# Patient Record
Sex: MALE | Race: BLACK OR AFRICAN AMERICAN | NOT HISPANIC OR LATINO | Employment: FULL TIME | URBAN - METROPOLITAN AREA
[De-identification: names, ages, dates, MRNs, and addresses within clinical notes are randomized per-mention and may not be internally consistent; named-entity substitution may affect disease eponyms.]

---
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
## 2023-02-27 ENCOUNTER — OFFICE VISIT (OUTPATIENT)
Dept: PSYCHOLOGY | Facility: CLINIC | Age: 18
End: 2023-02-27
Payer: MEDICAID

## 2023-02-27 DIAGNOSIS — F70 MILD INTELLECTUAL DISABILITY: ICD-10-CM

## 2023-02-27 DIAGNOSIS — F40.10 SOCIAL ANXIETY DISORDER: Primary | ICD-10-CM

## 2023-02-27 PROCEDURE — 99999 PR PBB SHADOW E&M-EST. PATIENT-LVL I: ICD-10-PCS | Mod: PBBFAC,,, | Performed by: PSYCHOLOGIST

## 2023-02-27 PROCEDURE — 90847 PR FAMILY PSYCHOTHERAPY W/ PT, 50 MIN: ICD-10-PCS | Mod: AH,HA,, | Performed by: PSYCHOLOGIST

## 2023-02-27 PROCEDURE — 90847 FAMILY PSYTX W/PT 50 MIN: CPT | Mod: AH,HA,, | Performed by: PSYCHOLOGIST

## 2023-02-27 PROCEDURE — 99999 PR PBB SHADOW E&M-EST. PATIENT-LVL I: CPT | Mod: PBBFAC,,, | Performed by: PSYCHOLOGIST

## 2023-02-27 PROCEDURE — 99211 OFF/OP EST MAY X REQ PHY/QHP: CPT | Mod: PBBFAC,PO | Performed by: PSYCHOLOGIST

## 2023-03-14 NOTE — PROGRESS NOTES
"[facility]  Integrated Primary Care Outpatient Clinic  Pediatric Psychology Follow-up Progress Note      Name: Shakeel Hudson III   MRN: [id number]   YOB: 2005; Age: 17 y.o.   Gender: Male   Date of evaluation: 2/27/2023   Payor: MEDICAID / Plan: [facility] COMMUNITY [facility] ([place] MEDICA[place]) / Product Type: Managed Medicaid /        REFERRAL REASON:   Shakeel Hudson III is a 17 y.o. Black or /Not  or /a male presenting to the [facility] outpatient clinic for follow-up.    Treatment goals:  Decrease functional impairment caused by referral concerns.   Learn adaptive coping skills to manage referral concerns.    SUBJECTIVE & OBJECTIVE:   Conducted brief check-in with patient and mother.  Family/patient reported that patient continues to endorse symptoms of depression and social anxiety. Shakeel reportedly gets very uncomfortable about engaging with strangers, making small talk, and being the center of attention; these concerns reportedly do interfere with independence in daily activities. Patient is open to starting treatment for social anxiety and learning strategies for managing his mood.   He described his mood as "in the middle". He denied any current/recent suicidal ideation, self-harm, homicidal ideation, intent, or plan. He reports feeling safe at home.     Behavioral Observations:  Appearance: Casually dressed, Well groomed, and No abnormalities noted  Behavior: Calm, Cooperative, Engaged, and Shy  Rapport: Difficult to establish and difficult to maintain  Mood: Anxious  Affect: Flat and Restricted  Psychomotor: Lethargic     Speech: Rate, rhythm, pitch, fluency, and volume WNL for chronological age  Language: Expressive language skills appear limited for chronological age    ASSESSMENT & PLAN:   Diagnostic Impressions:  Based on the diagnostic evaluation and background information provided, the current diagnoses are:     ICD-10-CM ICD-9-CM   1. " Social anxiety disorder  F40.10 300.23   2. Mild intellectual disability  F70 317       Treatment plan and recommended interventions:  Outpatient therapy/counseling: [facility] therapist (Joselyn [facility], [facility])    Reviewed information discussed at previous visit.  Conducted brief assessment of patient's current emotional and behavioral functioning.  Provided psychoeducation about the potential benefits of outpatient therapy to address the present referral concerns.    Response to intervention: cooperation. Intervention rationale: Intervention is consistent with evidence-based practice for patient's presenting concerns; Intervention addresses contextual factors impacting diagnosis, symptoms, and/or impairment. Patient/family appear to be making minimal progress  given their current stage in treatment.       Plan for follow up:   Psychology will continue to follow patient at future routine clinic visits.      No future appointments.      Start time: 2:37 PM  End time: 2:53 PM  Face-to-face: 16 minutes    Level of Service: Family therapy with patient, 26+ minutes [08421]  This includes face to face time and non-face to face time preparing to see the patient (eg, chart review), obtaining and/or reviewing separately obtained history, documenting clinical information in the electronic health record, independently interpreting results and communicating results to the patient/family/caregiver, care coordinator, and/or referring provider.       Marah Harrell, PhD  Licensed Clinical Psychologist ([place]#[id number]; MS#)  [facility], Integrated Primary Care Clinic  [address] [place] [address]. [place] [address]  [phone]        REFERRALS PROVIDED:     Orders Placed This Encounter   Procedures    Ambulatory referral/consult to Child/Adolescent Psychology   Joselyn

## 2023-03-14 NOTE — PATIENT INSTRUCTIONS
To schedule a follow-up visit with the Integrated Pediatric Primary Care Psychology team at [facility], please call Ailin Milan: [phone].      Other helpful contacts & resources:    [facility]  [address], [place], [place] [address] [phone]  [email or web address]      [facility]:  [address], [place] [address]  phone: [phone]   [email or web address]           Mental Health Services in the [facility] Area  [Last updated 12/19/22]    FOR ADDITIONAL OPTIONS, Search and browse providers by location, insurance, and concerns:  [employer] [email or web address].[email or web address]  Psychology Today [email or web address]://[email or web address] Corporation.WorkCast/us/therapists    Almost ALL providers can offer virtual visits for your convenience    [facility]    Child/Adolescent:       [address] [place] [address]  18 and older:          [address] [place] [address]   [phone]     [facility]  [address] [place] [address]  [email or web address]   [phone]     [facility]  [address] [place] [address]  https://[facility]/jayshree/counseling-and-training-center.html    Training clinic staffed by PhD students, does not require insurance. Completely free. Virtual visits only. [phone]     [facility]  Department of Psychology   [address] [place] [address]  [email or web address]://sse.[facility]/psyc/clinic   [phone]     [facility]  [address] [place] [address]  [email or web address]      [phone]   [facility] [place] [facility]  [address] [facility] [address] [place] [address]  [email or web address]://www.[facility].com/   [phone]   [facility]  [facility] [place] [address]  [email or web address]  [phone]   [facility]  [address] [place] [address]  [email or web address]   [phone]         Providers accepting Medicaid  [Last updated 12/19/22]    [facility]  [email or web address]://[email or web address].[email or web address].org/     [address] [place] [address] ([place])  [address] [place] [address]  [address] [facility] [place] [address]  [address] [place], [place] [address] [phone]   [facility]  [address] [place] [address]  www.[employer]interventionrehabilitation.PhoneTell    [phone]     [facility]   [address] [place] [address]  [email or web address]://www.[employer] Executive[employer]context.PhoneTell/behavioral-mental-health  [phone]   [facility]  https://[facility].PhoneTell/     Offers free in-home therapy for families with Medicaid in: [place], [place], [place], [facility], [place], [place], [place], & [facility] [phone]   [facility] ([facility]) - [address] [place] [address]  [email or web address]://[email or web address].[email or web address].org/[facility]   [phone]     [facility]   [address] [place] [address]   [email or web address]://[email or web address]    [phone]     [facility]  [address] [place] [address]   http://www.[facility].org/home.html     $25 for children without Medicaid    [phone]     Almost ALL providers can offer virtual visits for your convenience

## 2023-05-22 ENCOUNTER — TELEPHONE (OUTPATIENT)
Dept: PSYCHOLOGY | Facility: CLINIC | Age: 18
End: 2023-05-22
Payer: MEDICAID

## 2023-08-01 ENCOUNTER — PATIENT OUTREACH (OUTPATIENT)
Dept: PSYCHOLOGY | Facility: CLINIC | Age: 18
End: 2023-08-01
Payer: MEDICAID

## 2023-08-16 ENCOUNTER — PATIENT OUTREACH (OUTPATIENT)
Dept: PSYCHOLOGY | Facility: CLINIC | Age: 18
End: 2023-08-16
Payer: MEDICAID

## 2024-01-08 ENCOUNTER — OCCUPATIONAL HEALTH (OUTPATIENT)
Dept: URGENT CARE | Facility: CLINIC | Age: 19
End: 2024-01-08

## 2024-01-08 DIAGNOSIS — Z02.1 PRE-EMPLOYMENT EXAMINATION: Primary | ICD-10-CM

## 2024-01-08 LAB
CTP QC/QA: YES
FECAL OCCULT BLOOD, POC: NEGATIVE

## 2024-01-08 PROCEDURE — 82977 ASSAY OF GGT: CPT | Mod: QW,S$GLB,, | Performed by: EMERGENCY MEDICINE

## 2024-01-08 PROCEDURE — 86580 TB INTRADERMAL TEST: CPT | Mod: S$GLB,,, | Performed by: EMERGENCY MEDICINE

## 2024-01-08 PROCEDURE — 82270 OCCULT BLOOD FECES: CPT | Mod: S$GLB,,, | Performed by: EMERGENCY MEDICINE

## 2024-01-08 PROCEDURE — 97750 PHYSICAL PERFORMANCE TEST: CPT | Mod: S$GLB,,, | Performed by: EMERGENCY MEDICINE

## 2024-01-08 PROCEDURE — 99499 UNLISTED E&M SERVICE: CPT | Mod: S$GLB,,, | Performed by: EMERGENCY MEDICINE

## 2024-01-08 PROCEDURE — 92552 PURE TONE AUDIOMETRY AIR: CPT | Mod: S$GLB,,, | Performed by: EMERGENCY MEDICINE

## 2024-01-08 PROCEDURE — 86592 SYPHILIS TEST NON-TREP QUAL: CPT | Mod: S$GLB,,, | Performed by: EMERGENCY MEDICINE

## 2024-01-08 PROCEDURE — 80061 LIPID PANEL: CPT | Mod: QW,S$GLB,, | Performed by: EMERGENCY MEDICINE

## 2024-01-08 PROCEDURE — 80053 COMPREHEN METABOLIC PANEL: CPT | Mod: QW,S$GLB,, | Performed by: EMERGENCY MEDICINE

## 2024-01-08 PROCEDURE — 87389 HIV-1 AG W/HIV-1&-2 AB AG IA: CPT | Mod: QW,S$GLB,, | Performed by: EMERGENCY MEDICINE

## 2024-01-08 PROCEDURE — 81003 URINALYSIS AUTO W/O SCOPE: CPT | Mod: QW,S$GLB,, | Performed by: EMERGENCY MEDICINE

## 2024-01-08 PROCEDURE — 80305 DRUG TEST PRSMV DIR OPT OBS: CPT | Mod: S$GLB,,, | Performed by: EMERGENCY MEDICINE

## 2024-01-08 PROCEDURE — 85025 COMPLETE CBC W/AUTO DIFF WBC: CPT | Mod: QW,S$GLB,, | Performed by: EMERGENCY MEDICINE

## 2024-02-05 ENCOUNTER — OCCUPATIONAL HEALTH (OUTPATIENT)
Dept: URGENT CARE | Facility: CLINIC | Age: 19
End: 2024-02-05

## 2024-02-05 DIAGNOSIS — Z23 ENCOUNTER FOR IMMUNIZATION: Primary | ICD-10-CM

## 2024-02-05 PROCEDURE — 90739 HEPB VACC 2/4 DOSE ADULT IM: CPT | Mod: S$GLB,,, | Performed by: EMERGENCY MEDICINE

## 2024-02-05 PROCEDURE — 90471 IMMUNIZATION ADMIN: CPT | Mod: S$GLB,,, | Performed by: EMERGENCY MEDICINE

## 2024-03-04 ENCOUNTER — OCCUPATIONAL HEALTH (OUTPATIENT)
Dept: URGENT CARE | Facility: CLINIC | Age: 19
End: 2024-03-04

## 2024-03-04 DIAGNOSIS — Z23 ENCOUNTER FOR IMMUNIZATION: Primary | ICD-10-CM

## 2024-03-04 PROCEDURE — 90739 HEPB VACC 2/4 DOSE ADULT IM: CPT | Mod: S$GLB,,, | Performed by: EMERGENCY MEDICINE

## 2024-04-03 ENCOUNTER — OCCUPATIONAL HEALTH (OUTPATIENT)
Dept: URGENT CARE | Facility: CLINIC | Age: 19
End: 2024-04-03

## 2024-04-03 DIAGNOSIS — Z13.9 ENCOUNTER FOR SCREENING: Primary | ICD-10-CM

## 2024-04-03 PROCEDURE — 86706 HEP B SURFACE ANTIBODY: CPT | Mod: S$GLB,,, | Performed by: EMERGENCY MEDICINE
